# Patient Record
Sex: FEMALE | Race: WHITE
[De-identification: names, ages, dates, MRNs, and addresses within clinical notes are randomized per-mention and may not be internally consistent; named-entity substitution may affect disease eponyms.]

---

## 2021-07-08 ENCOUNTER — APPOINTMENT (OUTPATIENT)
Dept: PLASTIC SURGERY | Facility: CLINIC | Age: 56
End: 2021-07-08
Payer: SELF-PAY

## 2021-07-08 DIAGNOSIS — Z78.9 OTHER SPECIFIED HEALTH STATUS: ICD-10-CM

## 2021-07-08 DIAGNOSIS — Z98.82 BREAST IMPLANT STATUS: ICD-10-CM

## 2021-07-08 PROBLEM — Z00.00 ENCOUNTER FOR PREVENTIVE HEALTH EXAMINATION: Status: ACTIVE | Noted: 2021-07-08

## 2021-07-08 PROCEDURE — 99214 OFFICE O/P EST MOD 30 MIN: CPT | Mod: NC

## 2021-09-17 PROBLEM — Z98.82 BREAST IMPLANT STATUS: Status: ACTIVE | Noted: 2021-09-17

## 2021-09-17 NOTE — HISTORY OF PRESENT ILLNESS
[FreeTextEntry1] : pt is 56 y/o f c/o implants in for 15 years and she has gotten sl asymmetrical and droopy  she desires new saline implants and a mastopexy The risks, benefits, alternatives, limitations and the permanent scars were outlined with the patient. \par also discussed fat grafting  [email or web address] and malar areas bilaterally

## 2021-09-17 NOTE — ASSESSMENT
[FreeTextEntry1] :  pt is a good candidate for removal saline implants replacement with saline implants and mastopexy  SU will return to the office for a post procedure visit when ready for surgery